# Patient Record
Sex: FEMALE | Race: WHITE | NOT HISPANIC OR LATINO | Employment: FULL TIME | URBAN - METROPOLITAN AREA
[De-identification: names, ages, dates, MRNs, and addresses within clinical notes are randomized per-mention and may not be internally consistent; named-entity substitution may affect disease eponyms.]

---

## 2017-02-01 ENCOUNTER — ALLSCRIPTS OFFICE VISIT (OUTPATIENT)
Dept: OTHER | Facility: OTHER | Age: 37
End: 2017-02-01

## 2017-02-03 LAB
A. VAGINALIS BY PCR (HISTORICAL): NOT DETECTED
A.VAGINALIS LOG (CELL/ML) (HISTORICAL): <3.25
BVAB 2 (HISTORICAL): NOT DETECTED
C. ALBICANS, DNA OR PCR (HISTORICAL): NOT DETECTED
CANDIDA GENUS (HISTORICAL): NOT DETECTED
GARDNERELLA BY MOL. METHOD (HISTORICAL): <3.25
GARDNERELLA BY MOL. METHOD (HISTORICAL): NOT DETECTED
LACTOBACILLUS (SPECIES) (HISTORICAL): DETECTED
LACTOBACILLUS (SPECIES) (HISTORICAL): NORMAL
MEGASPHAERA TYPE 1 (HISTORICAL): NOT DETECTED
TRICHOMONAS (HISTORICAL): NOT DETECTED

## 2017-02-22 ENCOUNTER — GENERIC CONVERSION - ENCOUNTER (OUTPATIENT)
Dept: OTHER | Facility: OTHER | Age: 37
End: 2017-02-22

## 2017-02-22 DIAGNOSIS — N93.8 OTHER SPECIFIED ABNORMAL UTERINE AND VAGINAL BLEEDING: ICD-10-CM

## 2017-09-13 ENCOUNTER — GENERIC CONVERSION - ENCOUNTER (OUTPATIENT)
Dept: OTHER | Facility: OTHER | Age: 37
End: 2017-09-13

## 2017-09-20 ENCOUNTER — GENERIC CONVERSION - ENCOUNTER (OUTPATIENT)
Dept: OTHER | Facility: OTHER | Age: 37
End: 2017-09-20

## 2018-01-11 NOTE — MISCELLANEOUS
Message   Recorded as Task   Date: 07/08/2016 08:21 AM, Created By: [facility]   Task Name: Follow Up   Assigned To: [facility],Team   Regarding Patient: Harriett Scott, Status: Active   CommentKaylyn Wright - 08 Jul 2016 8:21 AM     TASK CREATED    Routine post op call placed to patient  Excision endometrioma abdominal wall [id number] done on 7/7/16  Patient answered and had no questions or concerns  Patient states she is doing good  Verified post op appointment on 7/21/16 at 3:30pm at the [facility]  Path pending  [facility] - 15 Jul 2016 3:59 PM     TASK EDITED  Left message for patient to call office for results  {Results negative }   Cindy Aggarwal - 15 Jul 2016 4:06 PM     TASK EDITED  Patient returned call and negative results given  {Results reviewed with Asha Lawrence prior to calling patient }  Patient had no further questions or concerns  Active Problems    1  Encounter for routine gynecological examination (V72 31) (Z01 419)   2  Endometrioma (617 9) (N80 9)   3  Female pelvic pain (625 9) (R10 2)   4  Screening for HPV (human papillomavirus) (V73 81) (Z11 51)    Current Meds   1  No Reported Medications Recorded    Allergies    1   Penicillins    Signatures   Electronically signed by : Margi Rogers, ; Jul 15 2016  4:06PM EST                       (Author)

## 2018-01-11 NOTE — MISCELLANEOUS
Message   Recorded as Task   Date: 10/13/2016 08:18 AM, Created By: Barbi Cobos   Task Name: Follow Up   Assigned To: [employer]   Regarding Patient: Mora Milligan, Status: In Progress   Comment:    Barbi Cobos - 13 Oct 2016 8:18 AM     TASK CREATED  pt was seen by you last week and treated for yeast, pt was better after finishing medication  however as of yesterday she is having all the sx back states it is so bad again irritation, ithing, burning, no discharge or odor  would you like to retreat? please advise thank you  Kaci Barrientos - 13 Oct 2016 8:46 AM     TASK REPLIED TO: Previously Assigned To Kaci Barrientos  please see previous task on pt that is still open - she also had BV on her cultures and I asked that she be treated for this - looks like it was never done  Would not retreat for yeast since she has not treated BV yet  Barbi Cobos - 13 Oct 2016 9:00 AM     TASK IN PROGRESS   Barbi Cobos - 13 Oct 2016 9:01 AM     TASK REASSIGNED: Previously Assigned To Dale Silva - 13 Oct 2016 9:01 AM     TASK EDITED                 very sorry thank you!! Active Problems    1  Bacterial vaginosis (616 10,041 9) (N76 0,B96 89)   2  Encounter for gynecological examination without abnormal finding (V72 31) (Z01 419)   3  Vaginal candidiasis (112 1) (B37 3)    Current Meds   1  Fluconazole 150 MG Oral Tablet (Diflucan); diflucan 150mg,,,take 1 today and repeat in   3 days; Therapy: 42BRR6708 to (Last Rx:07Oct2016)  Requested for: 07Oct2016 Ordered   2  Terconazole 0 8 % Vaginal Cream (Terazol 3); INSERT 1 APPLICATORFUL   INTRAVAGINALLY AT BEDTIME; Therapy: 36IQG8629 to (Evaluate:10Oct2016)  Requested for: 83KHQ3845; Last   Rx:07Oct2016 Ordered    Allergies    1   Penicillins    Plan  Bacterial vaginosis    · Tinidazole 500 MG Oral Tablet (Tindamax); take 2 tablet daily    Signatures   Electronically signed by : Laura La LPN; Oct 13 1983  5:14UB EST (Author)

## 2018-01-12 NOTE — MISCELLANEOUS
Message   Recorded as Task   Date: 02/21/2017 10:42 AM, Created By: Ana Rosa Mendez   Task Name: Medical Complaint Callback   Assigned To: Haven Macedo   Regarding Patient: Park Ayala, Status: Active   Damian Montilla - 21 Feb 2017 10:42 AM     TASK CREATED  Caller: Self; Medical Complaint; [phone] (Home)  Pt following up from last visit w you  Menses was more than a week late and having hot flashes  Pt would like to know what the next step in treatment would be  Pt is available before 1pm or after 3:30pm @ [phone]   Kaci Barrientos - 21 Feb 2017 10:49 AM     TASK REPLIED TO: Previously Assigned To Kaci Barrientos  can check TSH and if normal could consider low dose OCPs for treatment  Soni Shannon - 22 Feb 2017 10:39 AM     TASK REASSIGNED: Previously Assigned To Soni Shannon  see comments below   Eusebio James - 22 Feb 2017 12:07 PM     TASK EDITED  Pt will go for tsh  Slip to [facility] at [place]        Active Problems    1  Abdominal pain, RLQ (right lower quadrant) (789 03) (R10 31)   2  DUB (dysfunctional uterine bleeding) (626 8) (N93 8)   3  Encounter for gynecological examination without abnormal finding (V72 31) (Z01 419)   4  Pelvic pain (R10 2)   5  Vaginal discharge (623 5) (N89 8)    Current Meds   1  Nystatin-Triamcinolone 077396-1 1 UNIT/GM-% External Ointment; apply sparingly to   affected area bid x 2 weeks; Therapy: 99JMZ0301 to (Jarome Duel)  Requested for: 31MOA8562; Last   Rx:18Oct2016 Ordered    Allergies    1  Penicillins    Plan  DUB (dysfunctional uterine bleeding)    · (1) TSH; Status:Active; Requested for:15Ndb0112;     Signatures   Electronically signed by :  Samm Cortez, ; Feb 22 2017 12:07PM EST                       (Author)

## 2018-01-13 VITALS — BODY MASS INDEX: 29.62 KG/M2 | SYSTOLIC BLOOD PRESSURE: 130 MMHG | DIASTOLIC BLOOD PRESSURE: 74 MMHG | WEIGHT: 178 LBS

## 2018-01-13 NOTE — MISCELLANEOUS
Message   Recorded as Task   Date: 09/20/2017 07:43 AM, Created By: Lit Glass   Task Name: Follow Up   Assigned To: Manish Griffith   Regarding Patient: Amanda Knox, Status: In Progress   Dmitry Perkins - 20 Sep 2017 7:43 AM     TASK CREATED  Caller: Self; [phone] (Home); [phone] x,,,,, (Work)  pt said getting her period x2 in 14 days, what to do please call her at [address] - 20 Sep 2017 7:59 AM     TASK IN PROGRESS   Selma Preston - 20 Sep 2017 8:24 AM     TASK EDITED  lmp 9/6 thru 9/10  Began bleeding again today   had vasectomy  Pt also had early menses in July - your notes mention it also  Any f/u? Thanks   Kaci Barrientos - 20 Sep 2017 8:44 AM     TASK REPLIED TO: Previously Assigned To Kaci Barrientos  so I think this is her second time of getting a period early in the past year  If that is true, then no need for workup at this time - I typically give people 2 times to have abnormal bleeds before working it up  If she prefers workup, can schedule for pelvic US, endometrial biopsy, and TSH  Selma Preston - 20 Sep 2017 9:11 AM     TASK EDITED  Pt would prefer to wait  Will call if another early menses        Active Problems    1  Encounter for gynecological examination without abnormal finding (V72 31) (Z01 419)   2  Encounter for screening mammogram for malignant neoplasm of breast (V76 12)   (Z12 31)    Current Meds   1  No Reported Medications Recorded    Allergies    1  Penicillins    Signatures   Electronically signed by :  Miguel Cortez, ; Sep 20 2017  9:11AM EST                       (Author)

## 2018-01-13 NOTE — MISCELLANEOUS
Message   Recorded as Task   Date: 10/11/2016 07:16 AM, Created By: Helene Head   Task Name: Go to Result   Assigned To: [facility],Team   Regarding Patient: Alysas Alvarado, Status: In Progress   Comment:    Kaci Barrientos - 11 Oct 2016 7:16 AM     TASK CREATED  Cultures show yeast and BV  I treated her for yeast  Please see how she is feeling  If still symptomatic can add Tindamax 500mg two po daily x 5 days  Thanks! Roxy Haines - 11 Oct 2016 8:39 AM     TASK IN PROGRESS   Roxy Haines - 11 Oct 2016 8:40 AM     TASK EDITED                 Jose Manuel Alana - 11 Oct 2016 8:40 AM     TASK EDITED                 Jose Manuel Alana - 13 Oct 2016 9:05 AM     TASK EDITED                 rx sent see other task for more details, pt having sx or irritation burning and itching with no odor and discharge  tx for BV per [facility] and cultures  Active Problems    1  Bacterial vaginosis (616 10,041 9) (N76 0,B96 89)   2  Encounter for gynecological examination without abnormal finding (V72 31) (Z01 419)   3  Vaginal candidiasis (112 1) (B37 3)    Current Meds   1  Fluconazole 150 MG Oral Tablet (Diflucan); diflucan 150mg,,,take 1 today and repeat in   3 days; Therapy: 54EKM4826 to (Last Rx:07Oct2016)  Requested for: 07Oct2016 Ordered   2  Terconazole 0 8 % Vaginal Cream (Terazol 3); INSERT 1 APPLICATORFUL   INTRAVAGINALLY AT BEDTIME; Therapy: 84GNB9287 to (Evaluate:10Oct2016)  Requested for: 10USF5298; Last   Rx:07Oct2016 Ordered    Allergies    1   Penicillins    Plan  Bacterial vaginosis    · Tinidazole 500 MG Oral Tablet (Tindamax); take 2 tablet daily    Signatures   Electronically signed by : Rina Castillo LPN; Oct 13 0820  2:91PG EST                       (Author)

## 2018-01-15 NOTE — MISCELLANEOUS
Message   Recorded as Task   Date: 10/13/2016 08:18 AM, Created By: Jae Martinez   Task Name: Follow Up   Assigned To: Екатерина Montero   Regarding Patient: Brit Serrano, Status: In Progress   Comment:    Jae Martinez - 13 Oct 2016 8:18 AM     TASK CREATED  pt was seen by you last week and treated for yeast, pt was better after finishing medication  however as of yesterday she is having all the sx back states it is so bad again irritation, ithing, burning, no discharge or odor  would you like to retreat? please advise thank you  Kaci Barrientos - 13 Oct 2016 8:46 AM     TASK REPLIED TO: Previously Assigned To Kaci Barrientos  please see previous task on pt that is still open - she also had BV on her cultures and I asked that she be treated for this - looks like it was never done  Would not retreat for yeast since she has not treated BV yet  Jae Martinez - 13 Oct 2016 9:00 AM     TASK IN PROGRESS   Jae Martinez - 13 Oct 2016 9:01 AM     TASK REASSIGNED: Previously Assigned To Maddy Sanford - 13 Oct 2016 9:01 AM     TASK EDITED                 very sorry thank you!!   Kaci Barrientos - 13 Oct 2016 9:50 AM     TASK Ova Lowers - 17 Oct 2016 7:39 AM     TASK REACTIVATED   Kenyatta Humphrey - 17 Oct 2016 7:44 AM     TASK EDITED  please return pts call   see previous task   Jae Martinez - 17 Oct 2016 7:55 AM     TASK IN PROGRESS   Jae Martinez - 17 Oct 2016 7:58 AM     TASK EDITED                 pt felt better through  last night has been taking abx as directed, has full blown sx again-apt made        Active Problems    1  Bacterial vaginosis (616 10,041 9) (N76 0,B96 89)   2  Encounter for gynecological examination without abnormal finding (V72 31) (Z01 419)   3  Vaginal candidiasis (112 1) (B37 3)    Current Meds   1  Fluconazole 150 MG Oral Tablet (Diflucan); diflucan 150mg,,,take 1 today and repeat in   3 days;    Therapy: 09FFW3433 to (Last Rx:07Oct2016)  Requested for: 07QYF8547 Ordered   2  Terconazole 0 8 % Vaginal Cream (Terazol 3); INSERT 1 APPLICATORFUL   INTRAVAGINALLY AT BEDTIME; Therapy: 71DOL9994 to (Evaluate:10Oct2016)  Requested for: 58HQK7880; Last   Rx:07Oct2016 Ordered   3  Tinidazole 500 MG Oral Tablet (Tindamax); take 2 tablet daily; Therapy: 11KZE8198 to (Evaluate:18Oct2016)  Requested for: 08SWJ6801; Last   Rx:13Oct2016 Ordered    Allergies    1   Penicillins    Signatures   Electronically signed by : Llewellyn Crigler, LPN; Oct 17 4376  1:96YO EST                       (Author)

## 2018-01-15 NOTE — MISCELLANEOUS
Message   Recorded as Task   Date: 07/20/2016 08:54 AM, Created By: Chadd Carmen   Task Name: Medical Complaint Callback   Assigned To: Zaki Ortiz   Regarding Patient: Christin Lerma, Status: In Progress   Comment:    Fabiola Whitmore - 20 Jul 2016 8:54 AM     TASK CREATED  Caller: Self; Medical Complaint; [phone] (Home); [phone] x,,,,, (Work)  wants to speak to Grazyna Laughter; had surgery 7/7 with Dr Andree Kohler her period after 18 days, a lot heavier than normal -endometrioma was removed pt is [place] - 20 Jul 2016 9:35 AM     TASK IN PROGRESS   Dana Safer - 20 Jul 2016 9:46 AM     TASK EDITED  Pt has had regular menses in past 6 mos, FF surgery this menses 1 week early and using 5 tampons /day - normally uses 3,  I told pt to use advil q 4 hrs and observe,  Pt also feels swollen in abdomen - has appt with Alejandro this Fri  - to discuss with her - no fever ,etc   Dana Mccray - 20 Jul 2016 9:46 AM     TASK EDITED  To cb if bleeding heavier or won't stop        Active Problems    1  Encounter for routine gynecological examination (J12 31) (Z01 419)   2  Endometrioma (617 9) (N80 9)   3  Female pelvic pain (625 9) (R10 2)   4  Screening for HPV (human papillomavirus) (D33 81) (Z11 51)    Current Meds   1  No Reported Medications Recorded    Allergies    1  Penicillins    Signatures   Electronically signed by :  Eileen Cortez, ; Jul 20 2016  9:47AM EST                       (Author)

## 2018-01-16 NOTE — MISCELLANEOUS
Message   Recorded as Task   Date: 10/24/2016 03:56 PM, Created By: Abiodun Trinh   Task Name: Medical Complaint Callback   Assigned To: Haylee Baldwin   Regarding Patient: Jordi Larose, Status: In Progress   Comment:    Abiodun Trinh - 24 Oct 2016 3:56 PM     TASK CREATED  pt called back stating she finished ABx sx have all resolved with BV sx, no itching, burning, pain, discharge, or odor  Pt states she had mentioned to you on october 7th regarding her having hand tremors with hot flashes when she c/o of vaginal sx  I see no note of it patient is concerned it could be related to her vaginal sx  pt aware i will call her back tomorrow with your recommendations thank you  Abiodun Trinh - 24 Oct 2016 3:57 PM     TASK REASSIGNED: Previously Assigned To [address] - 25 Oct 2016 7:25 AM     TASK REPLIED TO: Previously Assigned To Kaci Barrientos  hand tremors and hot flashes are not related - would have her see her PCP for this  Rosario Ndiaye - 25 Oct 2016 8:18 AM     TASK IN PROGRESS   Abiodun Trinh - 25 Oct 2016 8:20 AM     TASK EDITED                 made pt aware of recommendations  Active Problems    1  Encounter for gynecological examination without abnormal finding (V72 31) (Z01 419)   2  Vaginal irritation (623 9) (N89 8)   3  Vaginal itching (698 1) (L29 8)   4  Vulvar candidiasis (112 1) (B37 3)    Current Meds   1  Nystatin-Triamcinolone 357089-0 1 UNIT/GM-% External Ointment; apply sparingly to   affected area bid x 2 weeks; Therapy: 60PCR9189 to (Shlomo Ortega)  Requested for: 44XXR9743; Last   Rx:18Oct2016 Ordered    Allergies    1   Penicillins    Signatures   Electronically signed by : Villa Cao LPN; Oct 25 5179  8:00ZE EST                       (Author)

## 2018-01-17 NOTE — MISCELLANEOUS
Message   Recorded as Task   Date: 10/06/2016 04:41 PM, Created By: Shonda Marie   Task Name: Call Back   Assigned To: [facility],Team   Regarding Patient: Julius Emerson, Status: In Progress   Keara Yu - 06 Oct 2016 4:41 PM     TASK CREATED  Caller: Self; [phone] (Home); [phone] x,,,,, (Work)  pt called - she is in a lot of pain in her vaginal area for the past couple days and there is redness  please advise [phone] - 06 Oct 2016 5:02 PM     TASK IN PROGRESS   Cornelia Susan - 06 Oct 2016 5:09 PM     TASK EDITED  Pt said for past 3 days she is in excruciating pain outside of vagina  Only sx is redness  No dsch  I told pt if pain that severe - ED  She said she would wait till tomorrow for ov with Nancy Richardson        Active Problems    1  Encounter for gynecological examination without abnormal finding (V72 31) (Z01 419)   2  Female pelvic pain (625 9) (R10 2)   3  Postoperative seroma (998 13)   4  Screening for HPV (human papillomavirus) (V73 81) (Z11 51)    Current Meds   1  No Reported Medications Recorded    Allergies    1  Penicillins    Signatures   Electronically signed by :  Marlin Cortez, ; Oct  6 2016  5:09PM EST                       (Author)

## 2018-01-18 NOTE — CONSULTS
Assessment    1  History of Abdominal wall lump (053 33) (R19 00)    Discussion/Summary  Discussion Summary:   15-year-old female with what appears to be an endometrioma measuring 2 x 1 cm on abdominal wall ultrasound just above the right hand side of her  section scar  1  Endometrioma abdominal wall  - Vision in the operating room same day surgery  Chief Complaint  Chief Complaint Free Text Note Form: Patient is here today for a consultation for an endometrioma  She has had this condition for approx 2-3 years at the site of her C section scar  It is painful and increasing in discomfort  fever/chills - denies   nausea/vomiting - denies   bowels - regular pattern       History of Present Illness  HPI: 15-year-old female who is 6 years out from their infection  3 years ago she developed a lump on the right side just above her  section scar  This lump is intermittently painful and is increasing in size  Recent ultrasound abdominal wall showed 2 x 1 cm endometrioma with endometrioma-like characteristics  No hernia  Sound otherwise was normal     She continues to eat and drink without difficulty  No prior infections in this area  Review of Systems  Complete-Female:   Constitutional: no fever and no chills  Eyes: no eye pain  ENT: no nosebleeds and no nasal discharge  Cardiovascular: no chest pain and no palpitations  Respiratory: no shortness of breath  Gastrointestinal: no abdominal pain  Genitourinary: no dysuria  Musculoskeletal: no arthralgias  Integumentary: no rashes  Neurological: no headache  Psychiatric: not suicidal    Endocrine: no proptosis  Hematologic/Lymphatic: no swollen glands  Active Problems    1  Encounter for routine gynecological examination (M82 31) (Z01 419)   2  Female pelvic pain (625 9) (R10 2)   3  Screening for HPV (human papillomavirus) (O63 81) (Z11 51)    Past Medical History    1   History of Abdominal wall lump (084 05) (R19 00)   2  History of Asthma (493 90) (J45 909)   3  History of Diabetes During Pregnancy   4  History of Encounter for routine gynecological examination (V72 31) (Z01 419)   5  History of ovarian cyst (V13 29) (Z87 42)   6  History of Vulvitis (616 10) (N76 2)  Active Problems And Past Medical History Reviewed: The active problems and past medical history were reviewed and updated today  Surgical History    1  History of  Section   2  History of Cholecystectomy Laparoscopic   3  History of Oral Surgery Tooth Extraction   4  History of Surgically Induced  - By Dilation And Evacuation  Surgical History Reviewed: The surgical history was reviewed and updated today  Family History  Mother    1  Family history of Vulvar cancer  Family History    2  Family history of Diabetes Mellitus (V18 0)   3  Family history of Hypertension (V17 49)  Family History Reviewed: The family history was reviewed and updated today  Social History    · Being A Social Drinker   · Caffeine Use   · Exercising Regularly   · Never A Smoker   · Denied: History of Never Used Drugs  Social History Reviewed: The social history was reviewed and updated today  Current Meds   1  No Reported Medications Recorded  Medication List Reviewed: The medication list was reviewed and updated today  Allergies    1  Penicillins    Vitals  Vital Signs [Data Includes: Current Encounter]    Recorded: 40EUN0050 03:18PM   Temperature 98 8 F, Tympanic   Heart Rate 84, L Radial   Pulse Quality Regular, L Radial   Respiration 16   Systolic 361, LUE, Sitting   Diastolic 78, LUE, Sitting   BP Cuff Size Large   Height 5 ft 5 in   Weight 177 lb 8 0 oz   BMI Calculated 29 54   BSA Calculated 1 88     Physical Exam    Constitutional   General appearance: No acute distress, well appearing and well nourished  Eyes   Conjunctiva and lids: No swelling, erythema or discharge      Pupils and irises: Equal, round and reactive to light  Sclera non-icteric  Ears, Nose, Mouth, and Throat   External inspection of ears and nose: Normal     Neck   Supple, symmetric, trachea midline, no masses   Pulmonary   Respiratory effort: No increased work of breathing or signs of respiratory distress  Auscultation of lungs: Clear to auscultation, equal breath sounds bilaterally, no wheezes, no rales, no rhonci  Cardiovascular   Auscultation of heart: Normal rate and rhythm, normal S1 and S2, without murmurs  Abdomen   Abdomen: Non-tender, no masses  Liver and spleen: No hepatomegaly or splenomegaly  Lymphatic   Palpation of lymph nodes in neck: No lymphadenopathy  Musculoskeletal   Gait and station: Normal     Skin   Skin and subcutaneous tissue: Normal without rashes or lesions  Neurologic   Cranial nerves: Cranial nerves 2-12 intact  Psychiatric   Orientation to person, place, and time: Normal     Mood and affect: Normal           Results/Data  Results   * US PELVIS W/TRANSVAG (PANEL) [id number] 08:49AM Lucy Luciano     Test Name Result Flag Reference   US PELVIS W/ TRANSVAG (PANEL) (Report)     [facility];[address] [facility];;[place];[address]   2015 0858   2015 0934   N/A     PELVIC ULTRASOUND, COMPLETE     INDICATION- Right lower quadrant pain, palpable abnormality          COMPARISON- 2014     TECHNIQUE-  Transabdominal pelvic ultrasound was performed in sagittal   and transverse planes with a curvilinear transducer  Additional   transvaginal imaging was performed to better evaluate the endometrium   and ovaries  Imaging included volumetric sweeps as well as traditional   still imaging technique  FINDINGS-   Adjacent to the  scar in the area of the palpable abnormality,   is a hypoechoic heterogeneous mass measuring 2 1 x 2 x 1 5 cm,   previously measuring 1 7 x 1 3 x 1 5 cm  There is minimal adjacent   vascularity  This is nonspecific but potentially an endometrioma       UTERUS-   The uterus is retroverted in position, measuring 9 9 x 3 9 x 4 7 cm  Contour and echotexture appear normal      The cervix shows no suspicious abnormality  ENDOMETRIUM-    Normal caliber of 10 mm  Homogenous and normal in appearance  OVARIES/ADNEXA-   Right ovary- 2 8 x 1 6 x 1 9 cm  No suspicious right ovarian abnormality  Doppler flow within normal limits  Left ovary- 2 9 x 2 x 2 cm  No suspicious left ovarian abnormality  Doppler flow within normal limits  No suspicious adnexal mass or loculated collections  There is no free fluid  IMPRESSION-   1  Adjacent to the  scar in the area of the palpable   abnormality, is a hypoechoic heterogeneous mass measuring 2 1 x 2 x 1 5   cm, previously measuring 1 7 x 1 3 x 1 5 cm  This is nonspecific but   potentially an endometrioma  This may be correlated clinically  2  Unremarkable uterus and ovaries  Transcribed on- [id number]     LILI Brown MD   [place] Radiologist- LILI Betancourt MD   Electronically Signed- LILI Betancourt MD   Released Date Time- 09/18/15 0943   ------------------------------------------------------------------------------   [id number]^LAKE Dutta Rociada   [id number]^LAKE REILLY     Provider Comments  Provider Comments: The nature of the procedure was explained to the patient at great length, including the risks, benefits, alternatives, and complications  The patient is aware of Dr Gabriella Shelton statistics and complication rates, they were discussed  Preoperative preparation was explained to the patient at length, including a bowel preparation if necessary  Patient was instructed to take their hypertension medications prior to surgery, stop any blood thinners, and modulate their diabetes medications as per their primary care physician's instructions  They understand the risks and benefits and agreed to the plan      Some portions of this records may have been generated with voice recognition software  There may be translation, syntax, or grammatical errors  Occasional wrong word or "sound-a-like" substitutions may have occurred due to the inherent limitations of the voice recognition software  Read the chart carefully and recognize, using context, where substitutions may have occurred  If you have any questions, please contact the dictating provider for clarification or correction, as needed             Future Appointments    Date/Time Provider Specialty Site   06/23/2016 03:00 PM Arnaldo Villagomez [facility] General Surgery [facility]   08/05/2016 08:20 AM Cathy Holguin [facility] Obstetrics/Gynecology [facility]     Signatures   Electronically signed by : Iker Herrera [facility]; May  5 2016  4:22PM EST                       (Author)    Electronically signed by : Willard Puentes MD; May  9 2016  1:11PM EST

## 2018-01-22 VITALS
HEIGHT: 64 IN | BODY MASS INDEX: 30.22 KG/M2 | DIASTOLIC BLOOD PRESSURE: 86 MMHG | SYSTOLIC BLOOD PRESSURE: 130 MMHG | WEIGHT: 177 LBS

## 2018-03-08 ENCOUNTER — TELEPHONE (OUTPATIENT)
Dept: OBGYN CLINIC | Facility: CLINIC | Age: 38
End: 2018-03-08

## 2018-03-08 DIAGNOSIS — B37.3 YEAST VAGINITIS: Primary | ICD-10-CM

## 2018-03-08 NOTE — TELEPHONE ENCOUNTER
Spoke with pt - symptoms started yesterday  Itching, strong odor, burning when urine touches the skin - feels irritated/inflamed  No discharge  No frequent urge to urinate  No bleeding  No OTC product used and no recent intercourse  Patient would like an Rx  Please advise  Thanks!

## 2018-03-08 NOTE — TELEPHONE ENCOUNTER
Spoke with patient  States symptoms consistent with yeast infection  Has used Diflucan in the past, asking if Anaquin Tiny can call in to her pharmacy  Will route to provider and call her back

## 2018-03-08 NOTE — TELEPHONE ENCOUNTER
What are her actual symptoms? Itching? Discharge? Burning? Odor? Urinary frequency/urgency? Dysuria?

## 2018-03-09 RX ORDER — FLUCONAZOLE 150 MG/1
TABLET ORAL
Qty: 2 TABLET | Refills: 0 | Status: SHIPPED | OUTPATIENT
Start: 2018-03-09 | End: 2018-03-12

## 2018-03-09 NOTE — TELEPHONE ENCOUNTER
Ok to use diflucan 150mg po x one dose, repeat in 3 days  If not all better in a week, she needs appt

## 2018-05-05 DIAGNOSIS — Z12.31 ENCOUNTER FOR SCREENING MAMMOGRAM FOR MALIGNANT NEOPLASM OF BREAST: ICD-10-CM

## 2018-10-30 ENCOUNTER — ANNUAL EXAM (OUTPATIENT)
Dept: OBGYN CLINIC | Facility: CLINIC | Age: 38
End: 2018-10-30
Payer: COMMERCIAL

## 2018-10-30 VITALS
SYSTOLIC BLOOD PRESSURE: 116 MMHG | DIASTOLIC BLOOD PRESSURE: 78 MMHG | BODY MASS INDEX: 30.16 KG/M2 | WEIGHT: 181 LBS | HEIGHT: 65 IN

## 2018-10-30 DIAGNOSIS — Z12.31 ENCOUNTER FOR SCREENING MAMMOGRAM FOR MALIGNANT NEOPLASM OF BREAST: ICD-10-CM

## 2018-10-30 DIAGNOSIS — R10.2 PELVIC PAIN: Primary | ICD-10-CM

## 2018-10-30 DIAGNOSIS — Z01.419 ENCNTR FOR GYN EXAM (GENERAL) (ROUTINE) W/O ABN FINDINGS: ICD-10-CM

## 2018-10-30 PROCEDURE — S0612 ANNUAL GYNECOLOGICAL EXAMINA: HCPCS | Performed by: PHYSICIAN ASSISTANT

## 2018-10-30 NOTE — PROGRESS NOTES
Oliver Zohra  1980      CC:  Yearly exam    S:  34 y o  female here for yearly exam  Her cycles are regular, every 21 days, not heavy or crampy despite her history of endometriosis  Recently she has started to have a sharp, stabbing pain in her right pelvis with her period, which she is concerned could be related to her endometriosis  It is not significant enough for her to want to do something about it  She has no pain with intercourse  She is sexually active with her   She uses vasectomy for contraception  Last Pap 8/3/15 neg/neg  Last Mammo never    No current outpatient prescriptions on file  Social History     Social History    Marital status: /Civil Union     Spouse name: N/A    Number of children: N/A    Years of education: N/A     Occupational History    Not on file  Social History Main Topics    Smoking status: Former Smoker    Smokeless tobacco: Never Used      Comment: quit 10 years ago    Alcohol use Yes      Comment: socially    Drug use: No    Sexual activity: Yes     Partners: Male     Birth control/ protection: Male Sterilization     Other Topics Concern    Not on file     Social History Narrative    No narrative on file     Family History   Problem Relation Age of Onset    Cancer Mother         vulvar    Stroke Father     Hypertension Father     Diabetes Father     Diabetes Brother     Hypertension Brother     Diabetes Brother     Hypertension Brother       Past Medical History:   Diagnosis Date    Abdominal wall lump     Anxiety     Asthma     sports related as a kid    Diabetes mellitus ([facility] [address] )     gestational    History of asthma     Ovarian cyst     Vulvitis         O:  Blood pressure 116/78, height 5' 4 96" (1 65 m), weight 82 1 kg (181 lb), last menstrual period 10/18/2018      Patient appears well and is not in distress  Neck is supple without masses  Breasts are symmetrical without mass, tenderness, nipple discharge, skin changes or adenopathy  Abdomen is soft and nontender without masses  External genitals are normal without lesions or rashes  Vagina is normal without discharge or bleeding  Cervix is normal without discharge or lesion  Uterus is normal, mobile, nontender without palpable mass  Right adnexa is full and tender  Left is nontender without mass  A:  Yearly exam  Endometriosis  Right pelvic pain  P:   Pap due 2020   Mammo slip provided    Check pelvic US    RTO one year for yearly exam or sooner as needed

## 2018-12-05 ENCOUNTER — HOSPITAL ENCOUNTER (OUTPATIENT)
Dept: RADIOLOGY | Age: 38
Discharge: HOME | End: 2018-12-05
Payer: COMMERCIAL

## 2018-12-05 VITALS — HEIGHT: 65 IN | BODY MASS INDEX: 29.32 KG/M2 | WEIGHT: 176 LBS

## 2018-12-05 DIAGNOSIS — Z12.31 ENCOUNTER FOR SCREENING MAMMOGRAM FOR MALIGNANT NEOPLASM OF BREAST: ICD-10-CM

## 2018-12-05 DIAGNOSIS — R10.2 PELVIC PAIN: ICD-10-CM

## 2018-12-05 PROCEDURE — 77063 BREAST TOMOSYNTHESIS BI: CPT

## 2018-12-05 PROCEDURE — 76856 US EXAM PELVIC COMPLETE: CPT

## 2018-12-05 PROCEDURE — 76830 TRANSVAGINAL US NON-OB: CPT

## 2018-12-05 PROCEDURE — 77067 SCR MAMMO BI INCL CAD: CPT

## 2018-12-06 ENCOUNTER — TELEPHONE (OUTPATIENT)
Dept: OBGYN CLINIC | Facility: CLINIC | Age: 38
End: 2018-12-06

## 2018-12-06 NOTE — TELEPHONE ENCOUNTER
----- Message from Bernarda Fraser PA-C sent at 12/6/2018 12:30 PM EST -----  Please let Larry Rea know that her pelvic ultrasound is completely normal  Thanks!

## 2019-02-15 ENCOUNTER — OFFICE VISIT (OUTPATIENT)
Dept: OBGYN CLINIC | Facility: CLINIC | Age: 39
End: 2019-02-15
Payer: COMMERCIAL

## 2019-02-15 VITALS — WEIGHT: 179 LBS | SYSTOLIC BLOOD PRESSURE: 122 MMHG | BODY MASS INDEX: 29.79 KG/M2 | DIASTOLIC BLOOD PRESSURE: 80 MMHG

## 2019-02-15 DIAGNOSIS — R53.83 OTHER FATIGUE: ICD-10-CM

## 2019-02-15 DIAGNOSIS — B96.89 BACTERIAL VAGINOSIS: Primary | ICD-10-CM

## 2019-02-15 DIAGNOSIS — N76.0 BACTERIAL VAGINOSIS: Primary | ICD-10-CM

## 2019-02-15 PROCEDURE — 99213 OFFICE O/P EST LOW 20 MIN: CPT | Performed by: PHYSICIAN ASSISTANT

## 2019-02-15 RX ORDER — TINIDAZOLE 500 MG/1
TABLET ORAL
Qty: 10 TABLET | Refills: 0 | Status: SHIPPED | OUTPATIENT
Start: 2019-02-15 | End: 2019-02-20

## 2019-02-15 RX ORDER — MULTIVIT-MIN/IRON FUM/FOLIC AC 7.5 MG-4
1 TABLET ORAL DAILY
COMMUNITY
End: 2020-06-12 | Stop reason: ALTCHOICE

## 2019-02-15 NOTE — PROGRESS NOTES
Bob Cuff  1980    S:  34 y o  female here for a problem visit  She notes white vaginal discharge without itching or burning, but with a fishy odor, for the past week or so  She noticed that it got worse after intercourse last week, and her  noticed it  She also notes fatigue, more than she would expect, in addition to weight gain/inability to lose weight  She also notes that her lips have been chapped for the past 3 months  She has some shortness of breath at night and was given an inhaler but doesn't want to use it for fear of it causing palpitations  She has a good deal of anxiety about this  She had some basic labs through her PCP that were normal  She remains concerned that something is wrong       Past Medical History:   Diagnosis Date    Abdominal wall lump     Anxiety     Asthma     sports related as a kid    Diabetes mellitus ([facility] [address]     gestational    History of asthma     Ovarian cyst     Vulvitis      Family History   Problem Relation Age of Onset    Cancer Mother         vulvar    Stroke Father     Hypertension Father     Diabetes Father     Diabetes Brother     Hypertension Brother     Diabetes Brother     Hypertension Brother      Social History     Socioeconomic History    Marital status: /Civil Union     Spouse name: None    Number of children: None    Years of education: None    Highest education level: None   Occupational History    None   Social Needs    Financial resource strain: None    Food insecurity:     Worry: None     Inability: None    Transportation needs:     Medical: None     Non-medical: None   Tobacco Use    Smoking status: Former Smoker    Smokeless tobacco: Never Used    Tobacco comment: quit 10 years ago   Substance and Sexual Activity    Alcohol use: Yes     Frequency: 2-4 times a month     Drinks per session: 1 or 2     Binge frequency: Never     Comment: socially    Drug use: No    Sexual activity: Yes     Partners: Male     Birth control/protection: Male Sterilization   Lifestyle    Physical activity:     Days per week: None     Minutes per session: None    Stress: None   Relationships    Social connections:     Talks on phone: None     Gets together: None     Attends Am[employer] service: None     Active member of club or organization: None     Attends meetings of clubs or organizations: None     Relationship status: None    Intimate partner violence:     Fear of current or ex partner: None     Emotionally abused: None     Physically abused: None     Forced sexual activity: None   Other Topics Concern    None   Social History Narrative    None       O:  /80 (BP Location: Right arm, Patient Position: Sitting, Cuff Size: Standard)   Wt 81 2 kg (179 lb)   LMP 02/03/2019   BMI 29 79 kg/m²   She appears well and is in no distress  Abdomen is soft and nontender  External genitals are normal without lesions or rashes  Vagina has copious thin white discharge  Cervix is normal, no lesions or discharge  Uterus is nontender, no masses  Adnexa are nontender, no pelvic masses appreciated    Wet mount reveals many clue cells, no trich, no yeast, pH 5 5, pos whiff     A/P:  Bacterial vaginosis  Tindamax 500mg two po daily x 5 days  Call in one week if not all better  Change to [facility] Sensitive Skin soap  Fatigue  Check ferritin, folate, B12, free T4, thyroid antibodies  Call pt with results

## 2019-03-21 ENCOUNTER — TELEPHONE (OUTPATIENT)
Dept: OBGYN CLINIC | Facility: CLINIC | Age: 39
End: 2019-03-21

## 2019-03-21 NOTE — TELEPHONE ENCOUNTER
Spoke with Pt today via phone call  Pt recently went to [facility] on 3/16/19 to complete lab work ordered on 2/15/19 by [facility] [employer]  Phone call made to [facility] customer service to request Pt's lab work results  Phone call to [facility] department to alert staff once results were receive to immediately post results in Pt's EHR

## 2019-03-21 NOTE — TELEPHONE ENCOUNTER
Dear Carmella Jacobs:    Chasity Lieberman Pt's recent lab work results today from [facility]  Pt informed that medical staff will notify Pt of said results upon receipt and review of results by provider  Please advise  Thank you!     Deirdre Lomeli MA

## 2019-03-21 NOTE — TELEPHONE ENCOUNTER
Spoke with Pt today via phone call  Pt informed that recent lab work results were normal per Georgean Burkitt' review  Reiterated to Pt that if she has any questions /concerns to contact office

## 2019-10-30 NOTE — PROGRESS NOTES
Wu Annamaria  1980      CC:  Yearly exam    S:  37 y o  female here for yearly exam  Her cycles are regular, not heavy or crampy  Sexual activity: She is sexually active without pain, bleeding or dryness  Contraception: She uses vasectomy for contraception  Last Pap 8/3/15 neg/neg  Last Mammo 12/5/18 neg    We reviewed [facility] guidelines for Pap testing today  Family hx of breast cancer: no  Family hx of ovarian cancer:no  Family hx of colon cancer: no  Family hx of vulvar cancer:  Mother      Current Outpatient Medications:     Alpha-Lipoic Acid 200 MG CAPS, Take by mouth, Disp: , Rfl:     Multiple Vitamins-Minerals (MULTIVITAMIN WITH MINERALS) tablet, Take 1 tablet by mouth daily, Disp: , Rfl:     TRYPTOPHAN PO, Take by mouth, Disp: , Rfl:   Social History     Socioeconomic History    Marital status: /Civil Union     Spouse name: Not on file    Number of children: Not on file    Years of education: Not on file    Highest education level: Not on file   Occupational History    Not on file   Social Needs    Financial resource strain: Not on file    Food insecurity:     Worry: Not on file     Inability: Not on file    Transportation needs:     Medical: Not on file     Non-medical: Not on file   Tobacco Use    Smoking status: Former Smoker    Smokeless tobacco: Never Used    Tobacco comment: quit 10 years ago   Substance and Sexual Activity    Alcohol use: Yes     Frequency: 2-4 times a month     Drinks per session: 1 or 2     Binge frequency: Never     Comment: socially    Drug use: No    Sexual activity: Yes     Partners: Male     Birth control/protection: Male Sterilization   Lifestyle    Physical activity:     Days per week: Not on file     Minutes per session: Not on file    Stress: Not on file   Relationships    Social connections:     Talks on phone: Not on file     Gets together: Not on file     Attends Gnosticist service: Not on file     Active member of club or organization: Not on file     Attends meetings of clubs or organizations: Not on file     Relationship status: Not on file    Intimate partner violence:     Fear of current or ex partner: Not on file     Emotionally abused: Not on file     Physically abused: Not on file     Forced sexual activity: Not on file   Other Topics Concern    Not on file   Social History Narrative    Not on file     Family History   Problem Relation Age of Onset    Cancer Mother         vulvar    Stroke Father     Hypertension Father     Diabetes Father     Diabetes Brother     Hypertension Brother     Diabetes Brother     Hypertension Brother       Past Medical History:   Diagnosis Date    Abdominal wall lump     Anxiety     Asthma     sports related as a kid    Diabetes mellitus ([facility] [address]     gestational    Endometriosis     History of asthma     Ovarian cyst     Vulvitis         Review of Systems   Respiratory: Negative  Cardiovascular: Negative  Gastrointestinal: Negative for constipation and diarrhea  Genitourinary: Negative for difficulty urinating, pelvic pain, vaginal bleeding, vaginal discharge, itching or odor  O:  Blood pressure 126/74, weight 85 3 kg (188 lb), last menstrual period 10/25/2019  Patient appears well and is not in distress  Neck is supple without masses  Breasts are symmetrical without mass, tenderness, nipple discharge, skin changes or adenopathy  Abdomen is soft and nontender without masses  External genitals are normal without lesions or rashes  Urethral meatus and urethra are normal  Bladder is normal to palpation  Vagina is normal without discharge or bleeding  Cervix is normal without discharge or lesion  Uterus is normal, mobile, nontender without palpable mass  Adnexa are normal, nontender, without palpable mass  A:  Yearly exam      P:   Pap and HPV today    Mammo slip provided    RTO one year for yearly exam or sooner as needed

## 2019-11-01 ENCOUNTER — ANNUAL EXAM (OUTPATIENT)
Dept: OBGYN CLINIC | Facility: CLINIC | Age: 39
End: 2019-11-01
Payer: COMMERCIAL

## 2019-11-01 VITALS — BODY MASS INDEX: 31.28 KG/M2 | DIASTOLIC BLOOD PRESSURE: 74 MMHG | WEIGHT: 188 LBS | SYSTOLIC BLOOD PRESSURE: 126 MMHG

## 2019-11-01 DIAGNOSIS — Z12.31 ENCOUNTER FOR SCREENING MAMMOGRAM FOR MALIGNANT NEOPLASM OF BREAST: ICD-10-CM

## 2019-11-01 DIAGNOSIS — Z01.419 ENCNTR FOR GYN EXAM (GENERAL) (ROUTINE) W/O ABN FINDINGS: ICD-10-CM

## 2019-11-01 PROCEDURE — 87624 HPV HI-RISK TYP POOLED RSLT: CPT | Performed by: PHYSICIAN ASSISTANT

## 2019-11-01 PROCEDURE — G0145 SCR C/V CYTO,THINLAYER,RESCR: HCPCS | Performed by: PHYSICIAN ASSISTANT

## 2019-11-01 PROCEDURE — S0612 ANNUAL GYNECOLOGICAL EXAMINA: HCPCS | Performed by: PHYSICIAN ASSISTANT

## 2019-11-01 RX ORDER — CEPHRADINE 500 MG
CAPSULE ORAL
COMMUNITY
End: 2020-06-12 | Stop reason: ALTCHOICE

## 2019-11-05 LAB
HPV HR 12 DNA CVX QL NAA+PROBE: NEGATIVE
HPV16 DNA CVX QL NAA+PROBE: NEGATIVE
HPV18 DNA CVX QL NAA+PROBE: NEGATIVE

## 2019-11-07 LAB
LAB AP GYN PRIMARY INTERPRETATION: NORMAL
Lab: NORMAL

## 2019-12-19 ENCOUNTER — TELEPHONE (OUTPATIENT)
Dept: OBGYN CLINIC | Facility: CLINIC | Age: 39
End: 2019-12-19

## 2020-02-19 ENCOUNTER — TELEPHONE (OUTPATIENT)
Dept: OBGYN CLINIC | Facility: CLINIC | Age: 40
End: 2020-02-19

## 2020-02-19 DIAGNOSIS — N39.0 URINARY TRACT INFECTION WITHOUT HEMATURIA, SITE UNSPECIFIED: Primary | ICD-10-CM

## 2020-02-19 RX ORDER — NITROFURANTOIN 25; 75 MG/1; MG/1
100 CAPSULE ORAL 2 TIMES DAILY
Qty: 10 CAPSULE | Refills: 0 | Status: SHIPPED | OUTPATIENT
Start: 2020-02-19 | End: 2020-02-24

## 2020-02-19 NOTE — TELEPHONE ENCOUNTER
Dear Garry Glass:    Pt called office today c/o UTI symptoms  Pt states she has UTI history  Pt saw you on 11/1/19 (yearly exam), scheduled to see you again on 11/4/20  Pt further states she has been experiencing urinary symptoms for approximately two days now  Pt reports urinary frequency, burning during urination, urine with "cloudy" appearance, and urine with peculiar odor  Pt denies fever, pyelo symptoms, gross hematuria, and vaginal symptoms at this time  Pt states she is allergic to Penicillin and it's derivatives  Pt further states she is allergic to Benadryl, Codeine, Percocet, and Vicodin with regards to allergies to medications  Lab orders completed for UA & UC tests (Pt states she uses [facility] lab facilities)  Pt instructed to complete UA & UC tests first before taking prescribed medication so as not to affect urine specimen  Pt informed that Rx for Macrobid 100 mg capsule (Take 1 capsule PO BID for 5 days - dispense 10 refills 0) was electronically forwarded to Pt's pharmacy in EHR per your UTI protocol  Pt further instructed to completely finish all of prescribed medication and to push fluids by mouth in order to flush out urinary system  Reiterated to Pt that if her symptoms worsen or do not improve after completion of medication to contact office  "Patient Call" routed to you for Rx signature  Please advise  Thank you!     Kirti Call MA

## 2020-02-28 ENCOUNTER — TELEPHONE (OUTPATIENT)
Dept: OBGYN CLINIC | Facility: CLINIC | Age: 40
End: 2020-02-28

## 2020-05-06 ENCOUNTER — OFFICE VISIT (OUTPATIENT)
Dept: OBGYN CLINIC | Facility: CLINIC | Age: 40
End: 2020-05-06
Payer: COMMERCIAL

## 2020-05-06 VITALS — DIASTOLIC BLOOD PRESSURE: 72 MMHG | SYSTOLIC BLOOD PRESSURE: 118 MMHG | WEIGHT: 187.4 LBS | BODY MASS INDEX: 31.18 KG/M2

## 2020-05-06 DIAGNOSIS — R10.2 PELVIC PAIN IN FEMALE: Primary | ICD-10-CM

## 2020-05-06 DIAGNOSIS — B37.3 YEAST VAGINITIS: ICD-10-CM

## 2020-05-06 PROCEDURE — 99213 OFFICE O/P EST LOW 20 MIN: CPT | Performed by: OBSTETRICS & GYNECOLOGY

## 2020-05-06 RX ORDER — FLUCONAZOLE 150 MG/1
150 TABLET ORAL ONCE
Qty: 1 TABLET | Refills: 0 | Status: SHIPPED | OUTPATIENT
Start: 2020-05-06 | End: 2020-05-06

## 2020-05-06 RX ORDER — NITROFURANTOIN 25; 75 MG/1; MG/1
CAPSULE ORAL
COMMUNITY
Start: 2020-05-02 | End: 2020-06-12 | Stop reason: ALTCHOICE

## 2020-05-13 ENCOUNTER — HOSPITAL ENCOUNTER (OUTPATIENT)
Dept: RADIOLOGY | Age: 40
Discharge: HOME | End: 2020-05-13
Payer: COMMERCIAL

## 2020-05-13 ENCOUNTER — TELEPHONE (OUTPATIENT)
Dept: OBGYN CLINIC | Facility: CLINIC | Age: 40
End: 2020-05-13

## 2020-05-13 DIAGNOSIS — R10.2 PELVIC PAIN IN FEMALE: ICD-10-CM

## 2020-05-13 PROCEDURE — 76830 TRANSVAGINAL US NON-OB: CPT

## 2020-05-13 PROCEDURE — 76856 US EXAM PELVIC COMPLETE: CPT

## 2020-05-14 ENCOUNTER — TELEPHONE (OUTPATIENT)
Dept: OBGYN CLINIC | Facility: CLINIC | Age: 40
End: 2020-05-14

## 2020-05-14 DIAGNOSIS — N83.209 HEMORRHAGIC CYST OF OVARY: Primary | ICD-10-CM

## 2020-05-19 ENCOUNTER — TELEPHONE (OUTPATIENT)
Dept: OBGYN CLINIC | Facility: CLINIC | Age: 40
End: 2020-05-19

## 2020-05-19 DIAGNOSIS — R10.2 PELVIC PAIN: Primary | ICD-10-CM

## 2020-05-20 ENCOUNTER — TELEPHONE (OUTPATIENT)
Dept: OBGYN CLINIC | Facility: CLINIC | Age: 40
End: 2020-05-20

## 2020-05-20 ENCOUNTER — HOSPITAL ENCOUNTER (OUTPATIENT)
Dept: RADIOLOGY | Facility: HOSPITAL | Age: 40
Discharge: HOME | End: 2020-05-20
Attending: OBSTETRICS & GYNECOLOGY
Payer: COMMERCIAL

## 2020-05-20 DIAGNOSIS — R10.2 PELVIC PAIN: ICD-10-CM

## 2020-05-20 PROCEDURE — 76830 TRANSVAGINAL US NON-OB: CPT

## 2020-05-20 PROCEDURE — 76856 US EXAM PELVIC COMPLETE: CPT

## 2020-05-21 ENCOUNTER — OFFICE VISIT (OUTPATIENT)
Dept: OBGYN CLINIC | Facility: CLINIC | Age: 40
End: 2020-05-21
Payer: COMMERCIAL

## 2020-05-21 ENCOUNTER — TELEPHONE (OUTPATIENT)
Dept: OBGYN CLINIC | Facility: CLINIC | Age: 40
End: 2020-05-21

## 2020-05-21 VITALS — BODY MASS INDEX: 31.22 KG/M2 | WEIGHT: 187.6 LBS | SYSTOLIC BLOOD PRESSURE: 118 MMHG | DIASTOLIC BLOOD PRESSURE: 72 MMHG

## 2020-05-21 DIAGNOSIS — B37.3 YEAST VAGINITIS: Primary | ICD-10-CM

## 2020-05-21 DIAGNOSIS — R10.9 FLANK PAIN: Primary | ICD-10-CM

## 2020-05-21 PROCEDURE — 99213 OFFICE O/P EST LOW 20 MIN: CPT | Performed by: OBSTETRICS & GYNECOLOGY

## 2020-05-21 RX ORDER — TRAMADOL HYDROCHLORIDE 50 MG/1
50 TABLET ORAL EVERY 6 HOURS PRN
Qty: 30 TABLET | Refills: 0 | Status: SHIPPED | OUTPATIENT
Start: 2020-05-21 | End: 2020-06-12 | Stop reason: ALTCHOICE

## 2020-05-21 RX ORDER — FLUCONAZOLE 150 MG/1
150 TABLET ORAL EVERY OTHER DAY
Qty: 2 TABLET | Refills: 0 | Status: SHIPPED | OUTPATIENT
Start: 2020-05-21 | End: 2020-05-24

## 2020-05-27 ENCOUNTER — TELEPHONE (OUTPATIENT)
Dept: OBGYN CLINIC | Facility: CLINIC | Age: 40
End: 2020-05-27

## 2020-06-02 ENCOUNTER — HOSPITAL ENCOUNTER (OUTPATIENT)
Dept: RADIOLOGY | Age: 40
Discharge: HOME | End: 2020-06-02
Payer: COMMERCIAL

## 2020-06-02 DIAGNOSIS — R10.9 FLANK PAIN: ICD-10-CM

## 2020-06-02 PROCEDURE — 74177 CT ABD & PELVIS W/CONTRAST: CPT

## 2020-06-02 RX ADMIN — IOHEXOL 100 ML: 350 INJECTION, SOLUTION INTRAVENOUS at 09:57

## 2020-06-04 ENCOUNTER — TELEPHONE (OUTPATIENT)
Dept: OBGYN CLINIC | Facility: CLINIC | Age: 40
End: 2020-06-04

## 2020-06-04 DIAGNOSIS — Q62.5: Primary | ICD-10-CM

## 2020-06-05 ENCOUNTER — TELEPHONE (OUTPATIENT)
Dept: UROLOGY | Facility: MEDICAL CENTER | Age: 40
End: 2020-06-05

## 2020-06-09 ENCOUNTER — TELEMEDICINE (OUTPATIENT)
Dept: UROLOGY | Facility: CLINIC | Age: 40
End: 2020-06-09
Payer: COMMERCIAL

## 2020-06-09 DIAGNOSIS — R39.15 URINARY URGENCY: Primary | ICD-10-CM

## 2020-06-09 PROCEDURE — 99214 OFFICE O/P EST MOD 30 MIN: CPT | Performed by: PHYSICIAN ASSISTANT

## 2020-06-12 ENCOUNTER — OFFICE VISIT (OUTPATIENT)
Dept: OBGYN CLINIC | Facility: CLINIC | Age: 40
End: 2020-06-12
Payer: COMMERCIAL

## 2020-06-12 VITALS — BODY MASS INDEX: 30.62 KG/M2 | DIASTOLIC BLOOD PRESSURE: 78 MMHG | SYSTOLIC BLOOD PRESSURE: 118 MMHG | WEIGHT: 184 LBS

## 2020-06-12 DIAGNOSIS — R10.2 PELVIC PAIN: Primary | ICD-10-CM

## 2020-06-12 PROCEDURE — 87086 URINE CULTURE/COLONY COUNT: CPT | Performed by: PHYSICIAN ASSISTANT

## 2020-06-12 PROCEDURE — 99213 OFFICE O/P EST LOW 20 MIN: CPT | Performed by: PHYSICIAN ASSISTANT

## 2020-06-13 LAB — BACTERIA UR CULT: NORMAL

## 2020-07-07 ENCOUNTER — HOSPITAL ENCOUNTER (OUTPATIENT)
Dept: RADIOLOGY | Age: 40
Discharge: HOME | End: 2020-07-07
Payer: COMMERCIAL

## 2020-07-07 DIAGNOSIS — N83.209 HEMORRHAGIC CYST OF OVARY: ICD-10-CM

## 2020-07-07 PROCEDURE — 76830 TRANSVAGINAL US NON-OB: CPT

## 2020-07-07 PROCEDURE — 76856 US EXAM PELVIC COMPLETE: CPT

## 2020-07-13 ENCOUNTER — TELEPHONE (OUTPATIENT)
Dept: OBGYN CLINIC | Facility: CLINIC | Age: 40
End: 2020-07-13

## 2020-07-13 NOTE — TELEPHONE ENCOUNTER
Pt contacted and advised as directed  Pt stated she read report and it stated adenomyosis  I advised pt to follow up as suggested with sebastian to discuss further  Pt agreed to plan of action

## 2020-07-13 NOTE — TELEPHONE ENCOUNTER
----- Message from Kenneth Miranda MD sent at 7/13/2020  8:58 AM EDT -----  Notify negative; please f/u with Edwin Steiner per her recommendations

## 2020-08-06 ENCOUNTER — TELEPHONE (OUTPATIENT)
Dept: OBGYN CLINIC | Facility: CLINIC | Age: 40
End: 2020-08-06

## 2020-08-06 NOTE — TELEPHONE ENCOUNTER
Patient is experiencing new symptoms and would like to discuss the results in more detail  Scheduled an appointment for her 8/11 but she would prefer to speak over the phone than have an appointment

## 2020-11-24 ENCOUNTER — ANNUAL EXAM (OUTPATIENT)
Dept: OBGYN CLINIC | Facility: CLINIC | Age: 40
End: 2020-11-24
Payer: COMMERCIAL

## 2020-11-24 VITALS
DIASTOLIC BLOOD PRESSURE: 68 MMHG | BODY MASS INDEX: 29.49 KG/M2 | SYSTOLIC BLOOD PRESSURE: 110 MMHG | WEIGHT: 177 LBS | HEIGHT: 65 IN

## 2020-11-24 DIAGNOSIS — Z01.419 ENCOUNTER FOR GYNECOLOGICAL EXAMINATION WITHOUT ABNORMAL FINDING: Primary | ICD-10-CM

## 2020-11-24 DIAGNOSIS — Z12.31 ENCOUNTER FOR SCREENING MAMMOGRAM FOR MALIGNANT NEOPLASM OF BREAST: ICD-10-CM

## 2020-11-24 PROCEDURE — S0612 ANNUAL GYNECOLOGICAL EXAMINA: HCPCS | Performed by: PHYSICIAN ASSISTANT

## 2021-08-20 ENCOUNTER — HOSPITAL ENCOUNTER (OUTPATIENT)
Dept: RADIOLOGY | Age: 41
Discharge: HOME | End: 2021-08-20
Payer: COMMERCIAL

## 2021-08-20 DIAGNOSIS — R73.03 PREDIABETES: ICD-10-CM

## 2021-08-20 DIAGNOSIS — N95.9 UNSPECIFIED MENOPAUSAL AND PERIMENOPAUSAL DISORDER: ICD-10-CM

## 2021-08-20 DIAGNOSIS — F41.1 GENERALIZED ANXIETY DISORDER: ICD-10-CM

## 2021-08-20 DIAGNOSIS — Z83.3 FAMILY HISTORY OF DIABETES MELLITUS: ICD-10-CM

## 2021-08-20 DIAGNOSIS — N94.3 PREMENSTRUAL TENSION SYNDROME: ICD-10-CM

## 2021-08-20 PROCEDURE — 76830 TRANSVAGINAL US NON-OB: CPT

## 2021-08-20 PROCEDURE — 76856 US EXAM PELVIC COMPLETE: CPT

## 2021-09-01 ENCOUNTER — OFFICE VISIT (OUTPATIENT)
Dept: OBGYN CLINIC | Facility: CLINIC | Age: 41
End: 2021-09-01
Payer: COMMERCIAL

## 2021-09-01 VITALS — DIASTOLIC BLOOD PRESSURE: 62 MMHG | WEIGHT: 185.8 LBS | BODY MASS INDEX: 31.4 KG/M2 | SYSTOLIC BLOOD PRESSURE: 124 MMHG

## 2021-09-01 DIAGNOSIS — N92.6 IRREGULAR MENSTRUAL CYCLE: Primary | ICD-10-CM

## 2021-09-01 PROCEDURE — 99213 OFFICE O/P EST LOW 20 MIN: CPT | Performed by: PHYSICIAN ASSISTANT

## 2021-09-01 RX ORDER — CLONAZEPAM 0.5 MG/1
TABLET ORAL
COMMUNITY
Start: 2021-08-13

## 2021-09-01 RX ORDER — MELOXICAM 7.5 MG/1
TABLET ORAL
COMMUNITY
Start: 2021-08-13

## 2021-09-01 RX ORDER — PANTOPRAZOLE SODIUM 40 MG/1
40 TABLET, DELAYED RELEASE ORAL DAILY
COMMUNITY
Start: 2021-08-13

## 2021-09-01 NOTE — PROGRESS NOTES
Aakashkristilatrice Led  1980    S:  35 y o  female here for a problem visit  She sees Dr Lisandra Kyle and is maintained on bioidentical hormones  She recently had a pelvic ultrasound ordered by him and was told it showed adenomyosis and she was asked to see us - she's not really sure why  She was told something about needing a biopsy  We reviewed her pelvic ultrasound showing likely adenomyosis and otherwise a normal study  We reviewed the last 8 months of cycles:   1/12-1/15  2/5/2/8  3/1-3/4  3/24-3/28  4/16-4/19  5/10-5/13  6/3-6/6 6/27-6/30 7/20-7/23 8/8-8/20 8/27-8/30 (brown spotting)    She is ranging from a 22-24 day cycle on average, and her most recent bleed was a 19 day cycle but her bleeding lasted for 12 days which is very unusual for her - the first time this has happened    Her thyroid testing was normal 2 months ago    We discussed potential reasons for abnormal bleeding  She did receive a COVID vaccine in April and May       Past Medical History:   Diagnosis Date    Abdominal wall lump     Anxiety     Asthma     sports related as a kid    Diabetes mellitus ([facility] [address]     gestational    Endometriosis     History of asthma     Ovarian cyst     Vulvitis      Family History   Problem Relation Age of Onset    Cancer Mother         vulvar    Stroke Father     Hypertension Father     Diabetes Father     Diabetes Brother     Hypertension Brother     Diabetes Brother     Hypertension Brother      Social History     Socioeconomic History    Marital status: /Civil Union     Spouse name: None    Number of children: None    Years of education: None    Highest education level: None   Occupational History    None   Tobacco Use    Smoking status: Former Smoker    Smokeless tobacco: Never Used    Tobacco comment: quit 10 years ago   Substance and Sexual Activity    Alcohol use: Yes     Comment: socially    Drug use: No    Sexual activity: Yes     Partners: Male     Birth control/protection: Male Sterilization   Other Topics Concern    None   Social History Narrative    None     Social Determinants of Health     Financial Resource Strain:     Difficulty of Paying Living Expenses:    Food Insecurity:     Worried About Running Out of Food in the Last Year:     Ran Out of Food in the Last Year:    Transportation Needs:     Lack of Transportation (Medical):  Lack of Transportation (Non-Medical):    Physical Activity:     Days of Exercise per Week:     Minutes of Exercise per Session:    Stress:     Feeling of Stress :    Social Connections:     Frequency of Communication with Friends and Family:     Frequency of Social Gatherings with Friends and Family:     Attends Jew Services:     Active Member of Clubs or Organizations:     Attends Club or Organization Meetings:     Marital Status:    Intimate Partner Violence:     Fear of Current or Ex-Partner:     Emotionally Abused:     Physically Abused:     Sexually Abused:        Review of Systems   Respiratory: Negative  Cardiovascular: Negative  Gastrointestinal: Negative for constipation and diarrhea  Genitourinary: Negative for difficulty urinating, pelvic pain, vaginal bleeding, vaginal discharge, itching or odor  O:  /62 (BP Location: Right arm, Patient Position: Sitting, Cuff Size: Standard)   Wt 84 3 kg (185 lb 12 8 oz)   LMP 08/08/2021   BMI 31 40 kg/m²   She appears well and is in no distress    A/P: Adenomyosis  Pt not currently bothered by terribly painful periods  Would observe   Irregular cycle x 1 - would observe  If recurrent would then bring back for endometrial biopsy but at this point I do not think this is necessary  She is in complete agreement

## 2023-04-26 ENCOUNTER — OFFICE VISIT (OUTPATIENT)
Dept: URGENT CARE | Age: 43
End: 2023-04-26

## 2023-04-26 VITALS
HEIGHT: 65 IN | WEIGHT: 169 LBS | BODY MASS INDEX: 28.16 KG/M2 | TEMPERATURE: 97.7 F | DIASTOLIC BLOOD PRESSURE: 82 MMHG | RESPIRATION RATE: 16 BRPM | SYSTOLIC BLOOD PRESSURE: 153 MMHG | HEART RATE: 92 BPM

## 2023-04-26 DIAGNOSIS — R22.0 LIP SWELLING: Primary | ICD-10-CM

## 2023-04-26 RX ORDER — PREDNISONE 20 MG/1
40 TABLET ORAL DAILY
Qty: 10 TABLET | Refills: 0 | Status: SHIPPED | OUTPATIENT
Start: 2023-04-26 | End: 2023-05-01

## 2023-04-26 NOTE — PROGRESS NOTES
[facility]        NAME: Melissa Cao is a 38 y o  female  : 1980    MRN: [id number]  DATE: 2023  TIME: 8:37 AM    Assessment and Plan   Lip swelling [R22 0]  1  Lip swelling  predniSONE 20 mg tablet        Given patient's Benadryl allergy and minimal relief with Zyrtec, will trial short course of steroids  Discussed with patient that if symptoms persist or worsen while on steroids, she should go to the ER for further evaluation of lip swelling  Patient Instructions     Please take prednisone daily for the next 5 days  If symptoms persist while on oral steroids, or you develop any worsening or concerning symptoms, please go to the ER  Chief Complaint     Chief Complaint   Patient presents with   • Lip Swelling     Yesterday patient noticed swelling in her mouth  States she used a lipstick that she has not used in a long time and is unaware if shes having a reaction  She took one dose of Zyrtec  History of Present Illness       Patient presenting for evaluation of lip swelling that began last night approximately 6 PM   Patient states that she used an old lipstick, when she noticed that her upper and lower lips began to swell  She denies any sensation of throat closure, oral itching or shortness of breath  She states that she trialed Zyrtec with minimal relief of her symptoms  Denies any exposure to any new soaps, lotions, detergents or medications that would cause lip swelling  Review of Systems   Review of Systems   Constitutional: Negative for chills and fever  HENT:        Lip Swelling      Respiratory: Negative for shortness of breath  Cardiovascular: Negative for chest pain  All other systems reviewed and are negative          Current Medications       Current Outpatient Medications:   •  predniSONE 20 mg tablet, Take 2 tablets (40 mg total) by mouth daily for 5 days, Disp: 10 tablet, Rfl: 0  •  Progesterone Micronized (EC-RX Progesterone) 20 % CREA, APPLY 1 PUMP (1ML) TO SKIN DAILY AT BEDTIME, Disp: , Rfl:   •  clonazePAM (KlonoPIN) 0 5 mg tablet, TAKE 1/2 TO 1 TABLETS BY MOUTH EVERY 12 HOURS AS NEEDED FOR ANXIETY OR SLEEP (Patient not taking: Reported on 2023), Disp: , Rfl:   •  meloxicam (MOBIC) 7 5 mg tablet, TAKE 1 TABLET BY MOUTH EVERY DAY IF NEEDED FOR JOINT PAINS (Patient not taking: Reported on 2023), Disp: , Rfl:   •  pantoprazole (PROTONIX) 40 mg tablet, Take 40 mg by mouth daily (Patient not taking: Reported on 2023), Disp: , Rfl:     Current Allergies     Allergies as of 2023 - Reviewed 2023   Allergen Reaction Noted   • Benadryl [diphenhydramine] Palpitations 2016   • Codeine Palpitations 2016   • Penicillins Other (See Comments) 2016   • Percocet [oxycodone-acetaminophen] GI Intolerance 2016   • Vicodin [hydrocodone-acetaminophen] GI Intolerance 2016            The following portions of the patient's history were reviewed and updated as appropriate: allergies, current medications, past family history, past medical history, past social history, past surgical history and problem list      Past Medical History:   Diagnosis Date   • Abdominal wall lump    • Anxiety    • Asthma     sports related as a kid   • Diabetes mellitus ([facility] [address] )     gestational   • Endometriosis    • History of asthma    • Ovarian cyst    • Vulvitis        Past Surgical History:   Procedure Laterality Date   •  SECTION     • CHEST WALL BIOPSY N/A 2016    Procedure: EXCISION  ENDOMETRIOMA ABDOMINAL WALL;  Surgeon: Uriah Ferreira MD;  Location: [facility];  Service:    • CHOLECYSTECTOMY     • COLONOSCOPY     • DILATION AND CURETTAGE OF UTERUS     • DILATION AND EVACUATION     • WISDOM TOOTH EXTRACTION         Family History   Problem Relation Age of Onset   • Cancer Mother         vulvar   • Stroke Father    • Hypertension Father    • Diabetes Father    • Diabetes Brother    • Hypertension Brother    • Diabetes Brother "  • Hypertension Brother          Medications have been verified  Objective   /82   Pulse 92   Temp 97 7 °F (36 5 °C)   Resp 16   Ht 5' 5\" (1 651 m)   Wt 76 7 kg (169 lb)   BMI 28 12 kg/m²        Physical Exam     Physical Exam  Vitals and nursing note reviewed  Constitutional:       General: She is not in acute distress  Appearance: Normal appearance  She is normal weight  She is not ill-appearing, toxic-appearing or diaphoretic  HENT:      Head: Normocephalic and atraumatic  Nose: Nose normal  No congestion or rhinorrhea  Mouth/Throat:      Mouth: Mucous membranes are moist       Pharynx: Oropharynx is clear  No oropharyngeal exudate or posterior oropharyngeal erythema  Comments: Swelling noted to upper and lower lip, mild swelling to skin surrounding upper and lower lip, no erythema or rash present  Eyes:      General:         Right eye: No discharge  Left eye: No discharge  Cardiovascular:      Rate and Rhythm: Normal rate and regular rhythm  Pulses: Normal pulses  Heart sounds: Normal heart sounds  No murmur heard  No friction rub  No gallop  Pulmonary:      Effort: Pulmonary effort is normal  No respiratory distress  Breath sounds: Normal breath sounds  No stridor  No wheezing, rhonchi or rales  Chest:      Chest wall: No tenderness  Abdominal:      General: Bowel sounds are normal       Palpations: Abdomen is soft  Tenderness: There is no abdominal tenderness  Skin:     General: Skin is warm and dry  Findings: No erythema or rash  Neurological:      Mental Status: She is alert     Psychiatric:         Mood and Affect: Mood normal          Behavior: Behavior normal                    "

## 2023-04-26 NOTE — PATIENT INSTRUCTIONS
Please take prednisone daily for the next 5 days  If symptoms persist while on oral steroids, or you develop any worsening or concerning symptoms, please go to the ER

## 2023-05-24 ENCOUNTER — OFFICE VISIT (OUTPATIENT)
Dept: URGENT CARE | Age: 43
End: 2023-05-24

## 2023-05-24 VITALS
OXYGEN SATURATION: 98 % | DIASTOLIC BLOOD PRESSURE: 70 MMHG | HEART RATE: 84 BPM | SYSTOLIC BLOOD PRESSURE: 124 MMHG | TEMPERATURE: 97.6 F | RESPIRATION RATE: 18 BRPM

## 2023-05-24 DIAGNOSIS — H10.31 ACUTE CONJUNCTIVITIS OF RIGHT EYE, UNSPECIFIED ACUTE CONJUNCTIVITIS TYPE: Primary | ICD-10-CM

## 2023-05-24 RX ORDER — GENTAMICIN SULFATE 3 MG/ML
1 SOLUTION/ DROPS OPHTHALMIC 3 TIMES DAILY
Qty: 5 ML | Refills: 0 | Status: SHIPPED | OUTPATIENT
Start: 2023-05-24

## 2023-05-24 RX ORDER — TINIDAZOLE 500 MG/1
TABLET ORAL
COMMUNITY
Start: 2023-03-29

## 2023-05-24 RX ORDER — SODIUM PICOSULFATE, MAGNESIUM OXIDE, AND ANHYDROUS CITRIC ACID 12; 3.5; 1 G/175ML; G/175ML; MG/175ML
LIQUID ORAL
COMMUNITY
Start: 2023-04-27

## 2023-05-24 NOTE — LETTER
May 24, 2023     Patient: Tomy Hanks   YOB: 1980   Date of Visit: 5/24/2023       To Whom it May Concern:    Bob Cesar was seen in my clinic on 5/24/2023  She may return ilir work on 05/25/2023  If you have any questions or concerns, please don't hesitate to call           Sincerely,          LUPE Grier        CC: No Recipients

## 2023-05-24 NOTE — PROGRESS NOTES
[facility]        NAME: Sherin Tejada is a 37 y o  female  : 1980    MRN: [id number]  DATE: May 24, 2023  TIME: 8:15 AM    Assessment and Plan   Acute conjunctivitis of right eye, unspecified acute conjunctivitis type [H10 31]  1  Acute conjunctivitis of right eye, unspecified acute conjunctivitis type  gentamicin (GARAMYCIN) 0 3 % ophthalmic solution            Patient Instructions     Wash hands frequently  Use drops as prescribed  Follow up with PCP in 3-5 days  Proceed to  ER if symptoms worsen  Chief Complaint     Chief Complaint   Patient presents with   • Eye Pain     Patient c/o of right eye pain and redness since yesterday she was wearing her cntact lens yesterday- she is also light sensitive         History of Present Illness       HPI   Presents to clinic with complaint of pain in the right eye with redness  Also watery discharge  Sensitivity to light  Uses contacts    Review of Systems   Review of Systems   Constitutional: Negative for fever  HENT: Negative for rhinorrhea  Eyes: Positive for photophobia, pain, discharge and redness  Negative for itching and visual disturbance  Respiratory: Negative for shortness of breath  Neurological: Negative for headaches           Current Medications       Current Outpatient Medications:   •  gentamicin (GARAMYCIN) 0 3 % ophthalmic solution, Administer 1 drop to the right eye 3 (three) times a day X 5 days, Disp: 5 mL, Rfl: 0  •  Clenpiq oral solution, USE AS DIRECTED TWO TIMES DAILY, Disp: , Rfl:   •  clonazePAM (KlonoPIN) 0 5 mg tablet, TAKE 1/2 TO 1 TABLETS BY MOUTH EVERY 12 HOURS AS NEEDED FOR ANXIETY OR SLEEP (Patient not taking: Reported on 2023), Disp: , Rfl:   •  meloxicam (MOBIC) 7 5 mg tablet, TAKE 1 TABLET BY MOUTH EVERY DAY IF NEEDED FOR JOINT PAINS (Patient not taking: Reported on 2023), Disp: , Rfl:   •  pantoprazole (PROTONIX) 40 mg tablet, Take 40 mg by mouth daily (Patient not taking: Reported on 2023), Disp: , Rfl:   •  Progesterone Micronized (EC-RX Progesterone) 20 % CREA, APPLY 1 PUMP (1ML) TO SKIN DAILY AT BEDTIME, Disp: , Rfl:   •  tinidazole (TINDAMAX) 500 MG tablet, TAKE 2 TABLETS BY MOUTH EVERY DAY FOR 5 DAYS, Disp: , Rfl:     Current Allergies     Allergies as of 2023 - Reviewed 2023   Allergen Reaction Noted   • Benadryl [diphenhydramine] Palpitations 2016   • Codeine Palpitations 2016   • Penicillins Other (See Comments) 2016   • Percocet [oxycodone-acetaminophen] GI Intolerance 2016   • Vicodin [hydrocodone-acetaminophen] GI Intolerance 2016            The following portions of the patient's history were reviewed and updated as appropriate: allergies, current medications, past family history, past medical history, past social history, past surgical history and problem list      Past Medical History:   Diagnosis Date   • Abdominal wall lump    • Anxiety    • Asthma     sports related as a kid   • Diabetes mellitus ([facility] [address] )     gestational   • Endometriosis    • History of asthma    • Ovarian cyst    • Vulvitis        Past Surgical History:   Procedure Laterality Date   •  SECTION     • CHEST WALL BIOPSY N/A 2016    Procedure: EXCISION  ENDOMETRIOMA ABDOMINAL WALL;  Surgeon: Yudy Kirby MD;  Location: [facility];  Service:    • CHOLECYSTECTOMY     • COLONOSCOPY     • DILATION AND CURETTAGE OF UTERUS     • DILATION AND EVACUATION     • WISDOM TOOTH EXTRACTION         Family History   Problem Relation Age of Onset   • Cancer Mother         vulvar   • Stroke Father    • Hypertension Father    • Diabetes Father    • Diabetes Brother    • Hypertension Brother    • Diabetes Brother    • Hypertension Brother          Medications have been verified  Objective   /70 (BP Location: Right arm, Patient Position: Sitting, Cuff Size: Standard)   Pulse 84   Temp 97 6 °F (36 4 °C)   Resp 18   SpO2 98%   No LMP recorded         Physical Exam     Physical Exam  Eyes:      General:         Right eye: Discharge (Watery discharge) present  Extraocular Movements: Extraocular movements intact  Pupils: Pupils are equal, round, and reactive to light  Comments: Mild conjunctival erythema of the right eye    Into the mucous membranes of the upper and lower eyelids erythematous, with mild swelling

## 2023-07-07 ENCOUNTER — OFFICE VISIT (OUTPATIENT)
Dept: URGENT CARE | Age: 43
End: 2023-07-07
Payer: COMMERCIAL

## 2023-07-07 VITALS
HEART RATE: 101 BPM | HEIGHT: 65 IN | BODY MASS INDEX: 28.82 KG/M2 | DIASTOLIC BLOOD PRESSURE: 86 MMHG | SYSTOLIC BLOOD PRESSURE: 130 MMHG | WEIGHT: 173 LBS | RESPIRATION RATE: 16 BRPM | TEMPERATURE: 97.8 F

## 2023-07-07 DIAGNOSIS — J02.0 STREP PHARYNGITIS: Primary | ICD-10-CM

## 2023-07-07 LAB — S PYO AG THROAT QL: POSITIVE

## 2023-07-07 PROCEDURE — G0382 LEV 3 HOSP TYPE B ED VISIT: HCPCS

## 2023-07-07 PROCEDURE — 87880 STREP A ASSAY W/OPTIC: CPT

## 2023-07-07 PROCEDURE — S9083 URGENT CARE CENTER GLOBAL: HCPCS

## 2023-07-07 RX ORDER — CLINDAMYCIN HYDROCHLORIDE 300 MG/1
300 CAPSULE ORAL 3 TIMES DAILY
Qty: 30 CAPSULE | Refills: 0 | Status: SHIPPED | OUTPATIENT
Start: 2023-07-07 | End: 2023-07-17

## 2023-07-07 NOTE — PROGRESS NOTES
[facility]        NAME: Anna Tavarez is a 37 y.o. female  : 1980    MRN: [id number]  DATE: 2023  TIME: 10:13 AM    Assessment and Plan   Strep pharyngitis [J02.0]  1. Strep pharyngitis  POCT rapid strepA    clindamycin (CLEOCIN) 300 MG capsule            Patient Instructions     Please complete full 10 days of antibiotic therapy. After 3 days of antibiotic therapy, please throw away your current toothbrush and begin using a new one. Please trial warm salt water gargles, Chloraseptic spray, Cepacol cough drops and warm tea with honey as needed for sore throat. Please  alternate ibuprofen and Tylenol as needed for fever and pain, and ensure adequate fluid intake and urine output. Follow up with PCP in 3-5 days. Proceed to  ER if symptoms worsen. Chief Complaint     Chief Complaint   Patient presents with   • Sore Throat     Patient yesterday started with sore throat and headache. She used Advil and cold medication. History of Present Illness       Patient presenting for evaluation of pharyngitis symptoms. Patient states that 2 days ago she developed a headache. She states that she has taken ibuprofen with minimal relief of her headache. She states the following day she developed a sore throat. She has been taking over-the-counter cold medication with minimal relief. Patient denies any fevers, chills, nausea, vomiting, diarrhea, chest pain or shortness of breath. She denies any known sick exposures. Sore Throat   Associated symptoms include congestion and headaches. Pertinent negatives include no coughing, diarrhea, shortness of breath or vomiting. Review of Systems   Review of Systems   Constitutional: Negative for chills and fever. HENT: Positive for congestion and sore throat. Respiratory: Negative for cough and shortness of breath. Cardiovascular: Negative for chest pain. Gastrointestinal: Negative for diarrhea, nausea and vomiting. Neurological: Positive for headaches. All other systems reviewed and are negative.         Current Medications       Current Outpatient Medications:   •  clindamycin (CLEOCIN) 300 MG capsule, Take 1 capsule (300 mg total) by mouth 3 (three) times a day for 10 days, Disp: 30 capsule, Rfl: 0  •  Progesterone Micronized (EC-RX Progesterone) 20 % CREA, APPLY 1 PUMP (1ML) TO SKIN DAILY AT BEDTIME, Disp: , Rfl:   •  Clenpiq oral solution, USE AS DIRECTED TWO TIMES DAILY, Disp: , Rfl:   •  clonazePAM (KlonoPIN) 0.5 mg tablet, TAKE 1/2 TO 1 TABLETS BY MOUTH EVERY 12 HOURS AS NEEDED FOR ANXIETY OR SLEEP (Patient not taking: Reported on 4/26/2023), Disp: , Rfl:   •  gentamicin (GARAMYCIN) 0.3 % ophthalmic solution, Administer 1 drop to the right eye 3 (three) times a day X 5 days, Disp: 5 mL, Rfl: 0  •  meloxicam (MOBIC) 7.5 mg tablet, TAKE 1 TABLET BY MOUTH EVERY DAY IF NEEDED FOR JOINT PAINS (Patient not taking: Reported on 4/26/2023), Disp: , Rfl:   •  pantoprazole (PROTONIX) 40 mg tablet, Take 40 mg by mouth daily (Patient not taking: Reported on 4/26/2023), Disp: , Rfl:   •  tinidazole (TINDAMAX) 500 MG tablet, TAKE 2 TABLETS BY MOUTH EVERY DAY FOR 5 DAYS, Disp: , Rfl:     Current Allergies     Allergies as of 07/07/2023 - Reviewed 07/07/2023   Allergen Reaction Noted   • Benadryl [diphenhydramine] Palpitations 07/01/2016   • Codeine Palpitations 07/01/2016   • Penicillins Other (See Comments) 07/01/2016   • Percocet [oxycodone-acetaminophen] GI Intolerance 07/01/2016   • Vicodin [hydrocodone-acetaminophen] GI Intolerance 07/01/2016            The following portions of the patient's history were reviewed and updated as appropriate: allergies, current medications, past family history, past medical history, past social history, past surgical history and problem list.     Past Medical History:   Diagnosis Date   • Abdominal wall lump    • Anxiety    • Asthma     sports related as a kid   • Diabetes mellitus ([address]) gestational   • Endometriosis    • History of asthma    • Ovarian cyst    • Vulvitis        Past Surgical History:   Procedure Laterality Date   •  SECTION     • CHEST WALL BIOPSY N/A 2016    Procedure: EXCISION  ENDOMETRIOMA ABDOMINAL WALL;  Surgeon: Alanna Dover MD;  Location: [facility];  Service:    • CHOLECYSTECTOMY     • COLONOSCOPY     • DILATION AND CURETTAGE OF UTERUS     • DILATION AND EVACUATION     • WISDOM TOOTH EXTRACTION         Family History   Problem Relation Age of Onset   • Cancer Mother         vulvar   • Stroke Father    • Hypertension Father    • Diabetes Father    • Diabetes Brother    • Hypertension Brother    • Diabetes Brother    • Hypertension Brother          Medications have been verified. Objective   /86   Pulse 101   Temp 97.8 °F (36.6 °C)   Resp 16   Ht 5' 5" (1.651 m)   Wt 78.5 kg (173 lb)   BMI 28.79 kg/m²        Physical Exam     Physical Exam  Vitals and nursing note reviewed. Constitutional:       General: She is not in acute distress. Appearance: Normal appearance. She is normal weight. She is not ill-appearing, toxic-appearing or diaphoretic. HENT:      Head: Normocephalic and atraumatic. Right Ear: Tympanic membrane normal.      Left Ear: Tympanic membrane normal.      Nose: Nose normal. No congestion or rhinorrhea. Mouth/Throat:      Mouth: Mucous membranes are moist.      Pharynx: Oropharynx is clear. Posterior oropharyngeal erythema present. No oropharyngeal exudate. Eyes:      General:         Right eye: No discharge. Left eye: No discharge. Cardiovascular:      Rate and Rhythm: Normal rate and regular rhythm. Pulses: Normal pulses. Heart sounds: Normal heart sounds. No murmur heard. No friction rub. No gallop. Pulmonary:      Effort: Pulmonary effort is normal. No respiratory distress. Breath sounds: Normal breath sounds. No stridor. No wheezing, rhonchi or rales.    Chest:      Chest wall: No tenderness. Abdominal:      General: Bowel sounds are normal.      Palpations: Abdomen is soft. Tenderness: There is no abdominal tenderness. Skin:     General: Skin is warm and dry. Neurological:      Mental Status: She is alert.    Psychiatric:         Mood and Affect: Mood normal.         Behavior: Behavior normal.

## 2023-07-07 NOTE — PATIENT INSTRUCTIONS
Please complete full 10 days of antibiotic therapy. After 3 days of antibiotic therapy, please throw away your current toothbrush and begin using a new one. Please trial warm salt water gargles, Chloraseptic spray, Cepacol cough drops and warm tea with honey as needed for sore throat. Please  alternate ibuprofen and Tylenol as needed for fever and pain, and ensure adequate fluid intake and urine output. 1.  Drink plenty fluids. 2.  Take probiotics [i.e. Yogurt, Acidophilus, Florastor (liquid)] daily. 3.  Over-the-counter medications as needed for symptomatic care. 4.   Advance activities as tolerated. 5.   Follow-up with your primary care physician in 3-4 days. 6.  Go to emergency room if symptoms are worsening.     7.  Use a humidifier at bedtime

## 2023-08-21 ENCOUNTER — HOSPITAL ENCOUNTER (OUTPATIENT)
Dept: RADIOLOGY | Age: 43
Discharge: HOME | End: 2023-08-21
Payer: COMMERCIAL

## 2023-08-21 VITALS — WEIGHT: 174 LBS | BODY MASS INDEX: 28.99 KG/M2 | HEIGHT: 65 IN

## 2023-08-21 DIAGNOSIS — Z12.31 ENCOUNTER FOR SCREENING MAMMOGRAM FOR MALIGNANT NEOPLASM OF BREAST: ICD-10-CM

## 2023-08-21 PROCEDURE — 77067 SCR MAMMO BI INCL CAD: CPT

## 2023-08-21 PROCEDURE — 77063 BREAST TOMOSYNTHESIS BI: CPT

## 2023-08-24 ENCOUNTER — HOSPITAL ENCOUNTER (OUTPATIENT)
Dept: MAMMOGRAPHY | Facility: CLINIC | Age: 43
Discharge: HOME | End: 2023-08-24
Payer: COMMERCIAL

## 2023-08-24 ENCOUNTER — HOSPITAL ENCOUNTER (OUTPATIENT)
Dept: ULTRASOUND IMAGING | Facility: CLINIC | Age: 43
Discharge: HOME | End: 2023-08-24
Payer: COMMERCIAL

## 2023-08-24 VITALS — BODY MASS INDEX: 28.99 KG/M2 | HEIGHT: 65 IN | WEIGHT: 174 LBS

## 2023-08-24 DIAGNOSIS — R92.8 ABNORMAL SCREENING MAMMOGRAM: ICD-10-CM

## 2023-08-24 PROCEDURE — 77065 DX MAMMO INCL CAD UNI: CPT

## 2023-08-24 PROCEDURE — G0279 TOMOSYNTHESIS, MAMMO: HCPCS

## 2023-08-24 PROCEDURE — 76642 ULTRASOUND BREAST LIMITED: CPT

## 2023-12-11 ENCOUNTER — OFFICE VISIT (OUTPATIENT)
Dept: URGENT CARE | Age: 43
End: 2023-12-11
Payer: COMMERCIAL

## 2023-12-11 VITALS
HEART RATE: 78 BPM | RESPIRATION RATE: 18 BRPM | OXYGEN SATURATION: 99 % | SYSTOLIC BLOOD PRESSURE: 142 MMHG | TEMPERATURE: 97.6 F | DIASTOLIC BLOOD PRESSURE: 99 MMHG

## 2023-12-11 DIAGNOSIS — R39.9 UTI SYMPTOMS: Primary | ICD-10-CM

## 2023-12-11 LAB
SL AMB  POCT GLUCOSE, UA: ABNORMAL
SL AMB LEUKOCYTE ESTERASE,UA: ABNORMAL
SL AMB POCT BILIRUBIN,UA: ABNORMAL
SL AMB POCT BLOOD,UA: ABNORMAL
SL AMB POCT CLARITY,UA: ABNORMAL
SL AMB POCT COLOR,UA: YELLOW
SL AMB POCT KETONES,UA: ABNORMAL
SL AMB POCT NITRITE,UA: ABNORMAL
SL AMB POCT PH,UA: 7
SL AMB POCT SPECIFIC GRAVITY,UA: 1
SL AMB POCT URINE PROTEIN: ABNORMAL
SL AMB POCT UROBILINOGEN: 0.2

## 2023-12-11 PROCEDURE — 87077 CULTURE AEROBIC IDENTIFY: CPT | Performed by: NURSE PRACTITIONER

## 2023-12-11 PROCEDURE — 87086 URINE CULTURE/COLONY COUNT: CPT | Performed by: NURSE PRACTITIONER

## 2023-12-11 PROCEDURE — G0382 LEV 3 HOSP TYPE B ED VISIT: HCPCS | Performed by: NURSE PRACTITIONER

## 2023-12-11 PROCEDURE — 87186 SC STD MICRODIL/AGAR DIL: CPT | Performed by: NURSE PRACTITIONER

## 2023-12-11 PROCEDURE — S9083 URGENT CARE CENTER GLOBAL: HCPCS | Performed by: NURSE PRACTITIONER

## 2023-12-11 PROCEDURE — 81002 URINALYSIS NONAUTO W/O SCOPE: CPT | Performed by: NURSE PRACTITIONER

## 2023-12-11 RX ORDER — NITROFURANTOIN 25; 75 MG/1; MG/1
100 CAPSULE ORAL 2 TIMES DAILY
Qty: 6 CAPSULE | Refills: 0 | Status: SHIPPED | OUTPATIENT
Start: 2023-12-11

## 2023-12-11 RX ORDER — PROGESTERONE 100 MG/1
CAPSULE ORAL
COMMUNITY
Start: 2022-11-01

## 2023-12-11 NOTE — PROGRESS NOTES
[facility]        NAME: Patty Daley is a 37 y.o. female  : 1980    MRN: [id number]  DATE: 2023  TIME: 8:32 AM    Assessment and Plan   UTI symptoms [R39.9]  1. UTI symptoms  Urine culture    POCT urine dip    nitrofurantoin (MACROBID) 100 mg capsule            Patient Instructions     Urine dip is positive for blood and leuks esterace  Will send for culture  Start antibiotics  Decongestant for sinus pressure   Follow up with PCP in 3-5 days. Proceed to  ER if symptoms worsen. Chief Complaint     Chief Complaint   Patient presents with    Possible UTI     Patient states that she developed a frontal HA yesterday and when she woke up this morning she had urgency to urinate. She also notes general fatigue and lethargy since the headache started. She notes lower abd pressure and difficulty emptying the bladder. Headache         History of Present Illness       HPI  Presents to clinic with complaint of urinary frequency and discomfort with urination. Also feeling of incomplete emptying of the bladder and bladder pressure. Symptoms started today. Also reports sinus pressure with frontal headache. This started yesterday. She took some Advil and went to bed. Woke up this morning and improved but present. No history of migraines. Review of Systems   Review of Systems   Constitutional:  Negative for fever. HENT:  Positive for congestion and sinus pressure. Negative for ear pain, rhinorrhea and sore throat. Respiratory:  Negative for cough, chest tightness and wheezing. Cardiovascular:  Negative for chest pain. Genitourinary:  Positive for dysuria and frequency. Neurological:  Positive for headaches.          Current Medications       Current Outpatient Medications:     metFORMIN (GLUCOPHAGE) 500 mg tablet, Take 500 mg by mouth 2 (two) times a day with meals, Disp: , Rfl:     nitrofurantoin (MACROBID) 100 mg capsule, Take 1 capsule (100 mg total) by mouth 2 (two) times a day, Disp: 6 capsule, Rfl: 0    Progesterone 100 MG CAPS, , Disp: , Rfl:     Clenpiq oral solution, USE AS DIRECTED TWO TIMES DAILY, Disp: , Rfl:     clonazePAM (KlonoPIN) 0.5 mg tablet, TAKE 1/2 TO 1 TABLETS BY MOUTH EVERY 12 HOURS AS NEEDED FOR ANXIETY OR SLEEP (Patient not taking: Reported on 2023), Disp: , Rfl:     gentamicin (GARAMYCIN) 0.3 % ophthalmic solution, Administer 1 drop to the right eye 3 (three) times a day X 5 days, Disp: 5 mL, Rfl: 0    meloxicam (MOBIC) 7.5 mg tablet, TAKE 1 TABLET BY MOUTH EVERY DAY IF NEEDED FOR JOINT PAINS (Patient not taking: Reported on 2023), Disp: , Rfl:     pantoprazole (PROTONIX) 40 mg tablet, Take 40 mg by mouth daily (Patient not taking: Reported on 2023), Disp: , Rfl:     Progesterone Micronized (EC-RX Progesterone) 20 % CREA, APPLY 1 PUMP (1ML) TO SKIN DAILY AT BEDTIME, Disp: , Rfl:     tinidazole (TINDAMAX) 500 MG tablet, TAKE 2 TABLETS BY MOUTH EVERY DAY FOR 5 DAYS, Disp: , Rfl:     Current Allergies     Allergies as of 2023 - Reviewed 2023   Allergen Reaction Noted    Benadryl [diphenhydramine] Palpitations 2016    Codeine Palpitations 2016    Penicillins Other (See Comments) 2016    Percocet [oxycodone-acetaminophen] GI Intolerance 2016    Vicodin [hydrocodone-acetaminophen] GI Intolerance 2016            The following portions of the patient's history were reviewed and updated as appropriate: allergies, current medications, past family history, past medical history, past social history, past surgical history and problem list.     Past Medical History:   Diagnosis Date    Abdominal wall lump     Anxiety     Asthma     sports related as a kid    Diabetes mellitus ([address])     gestational    Endometriosis     History of asthma     Ovarian cyst     Vulvitis        Past Surgical History:   Procedure Laterality Date     SECTION      CHEST WALL BIOPSY N/A 2016    Procedure: EXCISION  ENDOMETRIOMA ABDOMINAL WALL;  Surgeon: Shai Ha MD;  Location: [facility];  Service:     CHOLECYSTECTOMY      COLONOSCOPY      DILATION AND CURETTAGE OF UTERUS      DILATION AND EVACUATION      WISDOM TOOTH EXTRACTION         Family History   Problem Relation Age of Onset    Cancer Mother         vulvar    Stroke Father     Hypertension Father     Diabetes Father     No Known Problems Daughter     No Known Problems Maternal Grandmother     No Known Problems Maternal Grandfather     No Known Problems Paternal Grandmother     No Known Problems Paternal Grandfather     Diabetes Brother     Hypertension Brother     Diabetes Brother     Hypertension Brother     No Known Problems Paternal Aunt     Breast cancer Neg Hx     Colon cancer Neg Hx     Endometrial cancer Neg Hx     Ovarian cancer Neg Hx          Medications have been verified. Objective   /99   Pulse 78   Temp 97.6 °F (36.4 °C)   Resp 18   SpO2 99%   No LMP recorded. Physical Exam     Physical Exam  Constitutional:       Appearance: She is not ill-appearing or diaphoretic. HENT:      Head:      Comments: No tenderness to palpation of the frontal and maxillary sinuses. Feeling of pressure on the frontal sinuses     Right Ear: Tympanic membrane normal.      Left Ear: Tympanic membrane normal.      Nose: No rhinorrhea. Mouth/Throat:      Mouth: Mucous membranes are moist.      Pharynx: No posterior oropharyngeal erythema. Cardiovascular:      Rate and Rhythm: Regular rhythm. Heart sounds: Normal heart sounds. Pulmonary:      Effort: Pulmonary effort is normal.      Breath sounds: Normal breath sounds.

## 2023-12-13 LAB — BACTERIA UR CULT: ABNORMAL

## 2025-05-22 ENCOUNTER — TELEPHONE (OUTPATIENT)
Dept: MAMMOGRAPHY | Facility: CLINIC | Age: 45
End: 2025-05-22

## 2025-06-25 ENCOUNTER — HOSPITAL ENCOUNTER (OUTPATIENT)
Dept: RADIOLOGY | Age: 45
Discharge: HOME | End: 2025-06-25
Attending: PHYSICIAN ASSISTANT
Payer: COMMERCIAL

## 2025-06-25 DIAGNOSIS — R92.30 DENSE BREASTS, UNSPECIFIED: ICD-10-CM

## 2025-06-25 DIAGNOSIS — Z12.31 ENCOUNTER FOR SCREENING MAMMOGRAM FOR MALIGNANT NEOPLASM OF BREAST: ICD-10-CM

## 2025-06-25 PROCEDURE — 77067 SCR MAMMO BI INCL CAD: CPT

## 2025-06-25 PROCEDURE — 77063 BREAST TOMOSYNTHESIS BI: CPT

## 2025-06-25 PROCEDURE — 76641 ULTRASOUND BREAST COMPLETE: CPT
